# Patient Record
(demographics unavailable — no encounter records)

---

## 2025-02-21 NOTE — ASSESSMENT
[FreeTextEntry1] : 65-year-old female with intermittent bilateral lower extremity edema and discomfort.  Pedal pulses are intact bilaterally.  Based on clinical examination, there is no evidence of significant arterial sufficiency at this time.  Duplex today demonstrates venous insufficiency and a short segments of the greater saphenous vein bilaterally with no evidence of deep vein thrombosis or superficial phlebitis.  Recommend compression and leg elevation.  From a vascular standpoint, patient is cleared for knee replacement surgery.  No vascular intervention is required at this time.  Patient to follow-up as needed.

## 2025-02-21 NOTE — HISTORY OF PRESENT ILLNESS
[FreeTextEntry1] : Patient is a 65-year-old female with history significant for hypertension who presents to the office today for vascular clearance prior to knee replacement surgery.  Patient reports intermittent swelling and discomfort in the lower extremities for a number of years.  Patient endorses current use of compression stockings.  Denies fever or chills.  Denies chest pain or shortness of breath.  Denies rest pain or claudication symptoms.  Denies tissue loss.  No history of cardiac disease.  No history of TIA or CVA.  No history of deep vein thrombosis or pulmonary embolism.  No history of smoking.

## 2025-02-21 NOTE — PHYSICAL EXAM
[Normal Breath Sounds] : Normal breath sounds [Normal Rate and Rhythm] : normal rate and rhythm [2+] : left 2+ [Ankle Swelling (On Exam)] : present [Ankle Swelling Bilaterally] : bilaterally  [Ankle Swelling On The Right] : mild [No Rash or Lesion] : No rash or lesion [Alert] : alert [Calm] : calm [Varicose Veins Of Lower Extremities] : not present [] : not present [de-identified] : Appears well, no acute distress noted [de-identified] : No palpable cords.  No calf tenderness. [de-identified] : Intact